# Patient Record
Sex: MALE | Race: WHITE | Employment: OTHER | URBAN - METROPOLITAN AREA
[De-identification: names, ages, dates, MRNs, and addresses within clinical notes are randomized per-mention and may not be internally consistent; named-entity substitution may affect disease eponyms.]

---

## 2020-06-13 ENCOUNTER — TRANSFERRED RECORDS (OUTPATIENT)
Dept: HEALTH INFORMATION MANAGEMENT | Facility: CLINIC | Age: 40
End: 2020-06-13

## 2020-06-18 NOTE — TELEPHONE ENCOUNTER
RECORDS RECEIVED FROM: Internal    DATE RECEIVED: 7/22/20 3PM    NOTES STATUS DETAILS   OFFICE NOTE from referring provider Internal Telephone note 6/17/20    OFFICE NOTE from other specialist [facility] in    DISCHARGE SUMMARY from hospital Internal ED  Note 6/13/20, 6/11/20    MEDICATION LIST Internal         PERTINENT LABS Internal    IMAGING (CT, MRI, EGD) In process CT ABD PELVIS 6/12/20, 6/11/20   XR ABD 6/13/20      REFERRAL INFORMATION    Date referral was placed: 7/22/20   Date all records received:    Date records were scanned into EPIC:    Date records were sent to Provider to review:    Date and recommendation received from provider:  LETTER SENT  SCHEDULE APPOINTMENT   Date patient was contacted to schedule: 6/17/20

## 2020-06-24 ENCOUNTER — VIRTUAL VISIT (OUTPATIENT)
Dept: SURGERY | Facility: CLINIC | Age: 40
End: 2020-06-24
Payer: COMMERCIAL

## 2020-06-24 VITALS — WEIGHT: 190 LBS | BODY MASS INDEX: 25.73 KG/M2 | HEIGHT: 72 IN

## 2020-06-24 DIAGNOSIS — K57.32 DIVERTICULITIS OF COLON: Primary | ICD-10-CM

## 2020-06-24 RX ORDER — EPINEPHRINE 0.3 MG/.3ML
1 INJECTION SUBCUTANEOUS
COMMUNITY
End: 2020-10-05

## 2020-06-24 ASSESSMENT — PAIN SCALES - GENERAL: PAINLEVEL: NO PAIN (0)

## 2020-06-24 ASSESSMENT — MIFFLIN-ST. JEOR: SCORE: 1819.83

## 2020-06-24 NOTE — LETTER
"2020       RE: Dawood Perez  [address]  [place] [address]     Dear Colleague,    Thank you for referring your patient, Dawood Perez, to the [facility] COLON AND RECTAL SURGERY at [facility]. Please see a copy of my visit note below.    Colon and Rectal Surgery Consult Clinic Note    Referring provider:  Referred Self, MD  No address on file       RE: Dawood Perez  : 1980  ESTEFANIA: 2020      Dawood Perez is a very pleasant 40 year old male without a significant past medical history with a recent diagnosis of  Diverticulitis being evaluated via a billable video visit.  Given these findings they were subsequently sent to the Colon and Rectal Surgery Clinic for an opinion on this and a new patient consultation.       The patient has been notified of following:     \"This video visit will be conducted via a call between you and your physician/provider. We have found that certain health care needs can be provided without the need for an in-person physical exam.  This service lets us provide the care you need with a video conversation.  If a prescription is necessary we can send it directly to your pharmacy.  If lab work is needed we can place an order for that and you can then stop by our lab to have the test done at a later time.    Video visits are billed at different rates depending on your insurance coverage.  Please reach out to your insurance provider with any questions.    If during the course of the call the physician/provider feels a video visit is not appropriate, you will not be charged for this service.\"    Patient has given verbal consent for Video visit? Yes  How would you like to obtain your AVS? MyChart  Will anyone else be joining your video visit? No     Video-Visit Details    Type of service:  Video Visit    Video Start Time: 5:15  Video End Time: 5:46    Originating Location (pt. Location): Home    Distant Location (provider location): "  [facility] COLON AND RECTAL SURGERY     Platform used for Video Visit: Juan    HISTORY OF PRESENT ILNESS:    Previously healthy. Presented in June 11, 2020 with abdominal pain and fever.  This had been of 2 days duration and the fevers subsequently started and was associated with some constipation which responded to some milk of magnesia    The patient initially presented to primary care and was taken to the emergency room by nursing.  Work-up in the emergency room included a CT scan which demonstrated diverticulitis that was simple in nature.  He was discharged on oral ciprofloxacin and Flagyl.    The patient returned to the emergency room 2 days later with increasing and very severe pain but otherwise stable.  CT scan demonstrated ongoing diverticulitis as well as pneumoperitoneum.  He states that at this time the pain became quite severe while it had improved over the previous 2 days    The patient was transferred to [facility].  The diverticulitis eventually was found to be a Hinchey grade 3 and because of it the location of the abscess which was approximately 3 cm in size it was managed conservatively using antibiotics because it was within the mesentery and not amenable to interventional radiology drainage.  He was discharged on a two-week course of Augmentin on June 16, 2020.    Since discharge on June 16, 2020 the patient is overall doing well.  He states that he is eating a normal diet has normal bowel movements and feels mostly well.  He does state that he had decreased energy for a little bit but currently feels well.  He denies any difficulty with urination, eating, activity, or bowel movements.  He denies any bright red blood per rectum or change in bowel habits.    PLEASE SEE NOTE BELOW FOR PHYSICAL EXAMINATION, REVIEW OF SYSTEMS, AND OTHER HISTORY.      Assessment/Plan: 40 year old male without a significant past medical history with a recent diagnosis of  diverticulitis.  We  discussed that a colonoscopy would be indicated at approximately 8 weeks duration following his initial episode.  We referred him for this today.  It is unclear at this point whether a surgery done electively would be indicated.  He did respond well to conservative treatment despite his development of a contained free perforation.  I explained to him that while her previous thinking was that resection would eventually be indicated for a 40-year-old, this is currently not altogether clear.  Certainly recurrent episodes and/or a complicated episode would warrant surgery.  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  I did give him a prescription for Augmentin that he could use on an as-needed basis for when he does travel remotely as a backup plan.  We certainly do this for other patients that travel and this is sometimes a reason to get elective surgery.  We did speak about the goals of surgical treatment but more broadly in preventing disease.    Total time was 30 minutes, over 50% was spent counseling the patient.     PLEASE SEE NOTE BELOW FOR PHYSICAL EXAMINATION, REVIEW OF SYSTEMS, AND OTHER HISTORY.    Pao Motta MD, MD  Colon and Rectal Surgery Attending  Department of Surgery  [facility]      -------------------------------------------------------------------------------------------------------------------          Medical history:  No past medical history on file.    Surgical history:  No past surgical history on file.    Problem list:  There are no active problems to display for this patient.      Medications:  Current Outpatient Medications   Medication Sig Dispense Refill     amoxicillin-clavulanate (AUGMENTIN) 875-125 MG tablet Take 1 tablet by mouth 2 times daily for 14 days 28 tablet 1     EPINEPHrine (ANY BX GENERIC EQUIV) 0.3  MG/0.3ML injection 2-pack Inject 1 Syringe into the muscle         Allergies:  Allergies   Allergen Reactions     Bee Venom Hives     Penicillins Rash     Tolerated zosyn         Family history:  No family history on file.    Social history:  Social History     Socioeconomic History     Marital status: Single     Spouse name: Not on file     Number of children: Not on file     Years of education: Not on file     Highest education level: Not on file   Occupational History     Not on file   Social Needs     Financial resource strain: Not on file     Food insecurity     Worry: Not on file     Inability: Not on file     Transportation needs     Medical: Not on file     Non-medical: Not on file   Tobacco Use     Smoking status: Never Smoker     Smokeless tobacco: Never Used   Substance and Sexual Activity     Alcohol use: Not on file     Drug use: Not on file     Sexual activity: Not on file   Lifestyle     Physical activity     Days per week: Not on file     Minutes per session: Not on file     Stress: Not on file   Relationships     Social connections     Talks on phone: Not on file     Gets together: Not on file     Attends Synagogue service: Not on file     Active member of club or organization: Not on file     Attends meetings of clubs or organizations: Not on file     Relationship status: Not on file     Intimate partner violence     Fear of current or ex partner: Not on file     Emotionally abused: Not on file     Physically abused: Not on file     Forced sexual activity: Not on file   Other Topics Concern     Not on file   Social History Narrative     Not on file         Nursing Notes:   Derek Colbert EMT  6/24/2020  4:35 PM  Signed  Chief Complaint   Patient presents with     Consult     Diverticulitis.       Vitals:    06/24/20 1634   Weight: 190 lb   Height: 6'       Body mass index is 25.77 kg/m .      TERRANCE Wells                         Physical Examination:  Ht 1.829 m (6')   Wt 86.2 kg (190  lb)   BMI 25.77 kg/m    General: Pleasant, no acute distress   Visit was done via video and limited.     Again, thank you for allowing me to participate in the care of your patient.      Sincerely,    Pao Motta MD

## 2020-06-24 NOTE — LETTER
Date:July 9, 2020      Patient was self referred, no letter generated. Do not send.        [facility] Health Information

## 2020-06-24 NOTE — NURSING NOTE
Chief Complaint   Patient presents with     Consult     Diverticulitis.       Vitals:    06/24/20 1634   Weight: 190 lb   Height: 6'       Body mass index is 25.77 kg/m .      Derek Colbert, EMT

## 2020-06-28 NOTE — PROGRESS NOTES
"Colon and Rectal Surgery Consult Clinic Note    Referring provider:  Referred Self, MD  No address on file       RE: Dawood Perez  : 1980  ESTEFANIA: 2020      Dawood Perez is a very pleasant 40 year old male without a significant past medical history with a recent diagnosis of  Diverticulitis being evaluated via a billable video visit.  Given these findings they were subsequently sent to the Colon and Rectal Surgery Clinic for an opinion on this and a new patient consultation.       The patient has been notified of following:     \"This video visit will be conducted via a call between you and your physician/provider. We have found that certain health care needs can be provided without the need for an in-person physical exam.  This service lets us provide the care you need with a video conversation.  If a prescription is necessary we can send it directly to your pharmacy.  If lab work is needed we can place an order for that and you can then stop by our lab to have the test done at a later time.    Video visits are billed at different rates depending on your insurance coverage.  Please reach out to your insurance provider with any questions.    If during the course of the call the physician/provider feels a video visit is not appropriate, you will not be charged for this service.\"    Patient has given verbal consent for Video visit? Yes  How would you like to obtain your AVS? MyChart  Will anyone else be joining your video visit? No     Video-Visit Details    Type of service:  Video Visit    Video Start Time: 5:15  Video End Time: 5:46    Originating Location (pt. Location): Home    Distant Location (provider location):  [facility] COLON AND RECTAL SURGERY     Platform used for Video Visit: [employer]    HISTORY OF PRESENT ILNESS:    Previously healthy. Presented in 2020 with abdominal pain and fever.  This had been of 2 days duration and the fevers subsequently started and was associated with some " constipation which responded to some milk of magnesia    The patient initially presented to primary care and was taken to the emergency room by nursing.  Work-up in the emergency room included a CT scan which demonstrated diverticulitis that was simple in nature.  He was discharged on oral ciprofloxacin and Flagyl.    The patient returned to the emergency room 2 days later with increasing and very severe pain but otherwise stable.  CT scan demonstrated ongoing diverticulitis as well as pneumoperitoneum.  He states that at this time the pain became quite severe while it had improved over the previous 2 days    The patient was transferred to [facility].  The diverticulitis eventually was found to be a Hinchey grade 3 and because of it the location of the abscess which was approximately 3 cm in size it was managed conservatively using antibiotics because it was within the mesentery and not amenable to interventional radiology drainage.  He was discharged on a two-week course of Augmentin on June 16, 2020.    Since discharge on June 16, 2020 the patient is overall doing well.  He states that he is eating a normal diet has normal bowel movements and feels mostly well.  He does state that he had decreased energy for a little bit but currently feels well.  He denies any difficulty with urination, eating, activity, or bowel movements.  He denies any bright red blood per rectum or change in bowel habits.    PLEASE SEE NOTE BELOW FOR PHYSICAL EXAMINATION, REVIEW OF SYSTEMS, AND OTHER HISTORY.      Assessment/Plan: 40 year old male without a significant past medical history with a recent diagnosis of  diverticulitis.  We discussed that a colonoscopy would be indicated at approximately 8 weeks duration following his initial episode.  We referred him for this today.  It is unclear at this point whether a surgery done electively would be indicated.  He did respond well to conservative treatment despite  his development of a contained free perforation.  I explained to him that while her previous thinking was that resection would eventually be indicated for a 40-year-old, this is currently not altogether clear.  Certainly recurrent episodes and/or a complicated episode would warrant surgery.  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  I did give him a prescription for Augmentin that he could use on an as-needed basis for when he does travel remotely as a backup plan.  We certainly do this for other patients that travel and this is sometimes a reason to get elective surgery.  We did speak about the goals of surgical treatment but more broadly in preventing disease.    Total time was 30 minutes, over 50% was spent counseling the patient.     PLEASE SEE NOTE BELOW FOR PHYSICAL EXAMINATION, REVIEW OF SYSTEMS, AND OTHER HISTORY.    Pao Motta MD, MD  Colon and Rectal Surgery Attending  Department of Surgery  [facility]      -------------------------------------------------------------------------------------------------------------------          Medical history:  No past medical history on file.    Surgical history:  No past surgical history on file.    Problem list:  There are no active problems to display for this patient.      Medications:  Current Outpatient Medications   Medication Sig Dispense Refill     amoxicillin-clavulanate (AUGMENTIN) 875-125 MG tablet Take 1 tablet by mouth 2 times daily for 14 days 28 tablet 1     EPINEPHrine (ANY BX GENERIC EQUIV) 0.3 MG/0.3ML injection 2-pack Inject 1 Syringe into the muscle         Allergies:  Allergies   Allergen Reactions     Bee Venom Hives     Penicillins Rash     Tolerated zosyn         Family history:  No family history on file.    Social history:  Social History     Socioeconomic History      Marital status: Single     Spouse name: Not on file     Number of children: Not on file     Years of education: Not on file     Highest education level: Not on file   Occupational History     Not on file   Social Needs     Financial resource strain: Not on file     Food insecurity     Worry: Not on file     Inability: Not on file     Transportation needs     Medical: Not on file     Non-medical: Not on file   Tobacco Use     Smoking status: Never Smoker     Smokeless tobacco: Never Used   Substance and Sexual Activity     Alcohol use: Not on file     Drug use: Not on file     Sexual activity: Not on file   Lifestyle     Physical activity     Days per week: Not on file     Minutes per session: Not on file     Stress: Not on file   Relationships     Social connections     Talks on phone: Not on file     Gets together: Not on file     Attends Mu-ism service: Not on file     Active member of club or organization: Not on file     Attends meetings of clubs or organizations: Not on file     Relationship status: Not on file     Intimate partner violence     Fear of current or ex partner: Not on file     Emotionally abused: Not on file     Physically abused: Not on file     Forced sexual activity: Not on file   Other Topics Concern     Not on file   Social History Narrative     Not on file         Nursing Notes:   Derek Colbert EMT  6/24/2020  4:35 PM  Signed  Chief Complaint   Patient presents with     Consult     Diverticulitis.       Vitals:    06/24/20 1634   Weight: 190 lb   Height: 6'       Body mass index is 25.77 kg/m .      TERRANCE Wells                         Physical Examination:  Ht 1.829 m (6')   Wt 86.2 kg (190 lb)   BMI 25.77 kg/m    General: Pleasant, no acute distress   Visit was done via video and limited.

## 2020-06-29 DIAGNOSIS — K57.32 DIVERTICULITIS OF COLON: Primary | ICD-10-CM

## 2020-06-29 RX ORDER — POLYETHYLENE GLYCOL 3350 17 G/17G
238 POWDER, FOR SOLUTION ORAL SEE ADMIN INSTRUCTIONS
Qty: 238 G | Refills: 0 | Status: SHIPPED | OUTPATIENT
Start: 2020-06-29 | End: 2020-10-09

## 2020-07-06 ENCOUNTER — TELEPHONE (OUTPATIENT)
Dept: SURGERY | Facility: CLINIC | Age: 40
End: 2020-07-06

## 2020-07-06 NOTE — TELEPHONE ENCOUNTER
Per Case Request patient's Colonoscopy with Dr. Motta should be scheduled 8 weeks out from last Friday 7/3. I checked with SARIKA Caldera in office since 8 weeks out would not be on a surgery date for Dr. Motta. SARIKA Caldera said that as long as it is at least 8 weeks out, then it is fine. Tentative date would be 11 September, 2020. Hold placed on Dr. Motta's calendar.    Phone call to patient at provided number, left voicemail message to call back regarding scheduling.

## 2020-07-17 PROBLEM — K57.32 DIVERTICULITIS OF COLON: Status: ACTIVE | Noted: 2020-07-17

## 2020-07-17 NOTE — TELEPHONE ENCOUNTER
Patient is scheduled for surgery with Dr. Motta    Spoke with Dawood    Date of Surgery: 9/11/2020    Location: [facility]    Informed patient they will need an adult  Yes    H&P:   - will be scheduled with [facility] approximately 1-2 weeks before his procedure as soon as he has chosen a PCP (recently moved to [place] from [place]); he will send a message on [employer] to update us of his PCP choice and appointment info     COVID-19 Test:   - 9/9/2020 at 2:30 pm    Surgery packet: will send via [employer] and Mail

## 2020-07-20 DIAGNOSIS — Z11.59 ENCOUNTER FOR SCREENING FOR OTHER VIRAL DISEASES: Primary | ICD-10-CM

## 2020-07-22 ENCOUNTER — PRE VISIT (OUTPATIENT)
Dept: SURGERY | Facility: CLINIC | Age: 40
End: 2020-07-22

## 2020-08-12 ENCOUNTER — TELEPHONE (OUTPATIENT)
Dept: SURGERY | Facility: CLINIC | Age: 40
End: 2020-08-12

## 2020-08-12 NOTE — TELEPHONE ENCOUNTER
Patient called to reschedule his [facility] Colonoscopy with Dr Motta that was scheduled on 9/11/2020, but has now been moved to 10/9/2020:    Patient is scheduled for surgery with Dr. Ángela Puente with Dawood    Date of Surgery: 10/9/2020    Location: [facility]    Informed patient they will need an adult  Yes    H&P: Scheduled with PCP    COVID-19 test at the [facility] on 10/6/2020 at 2:30 pm    Surgery packet: will send via [employer] and Mail     Additional comments:   - rescheduled due to a wedding

## 2020-10-05 ENCOUNTER — OFFICE VISIT (OUTPATIENT)
Dept: FAMILY MEDICINE | Facility: CLINIC | Age: 40
End: 2020-10-05
Payer: COMMERCIAL

## 2020-10-05 VITALS
WEIGHT: 196 LBS | HEIGHT: 72 IN | DIASTOLIC BLOOD PRESSURE: 89 MMHG | BODY MASS INDEX: 26.55 KG/M2 | TEMPERATURE: 97.1 F | SYSTOLIC BLOOD PRESSURE: 138 MMHG | OXYGEN SATURATION: 98 % | HEART RATE: 98 BPM

## 2020-10-05 DIAGNOSIS — Z76.0 ENCOUNTER FOR MEDICATION REFILL: ICD-10-CM

## 2020-10-05 DIAGNOSIS — K57.32 DIVERTICULITIS OF COLON: ICD-10-CM

## 2020-10-05 DIAGNOSIS — Z01.818 PRE-OPERATIVE GENERAL PHYSICAL EXAMINATION: Primary | ICD-10-CM

## 2020-10-05 DIAGNOSIS — Z91.030 H/O BEE STING ALLERGY: ICD-10-CM

## 2020-10-05 LAB
HGB BLD-MCNC: 16.9 G/DL (ref 13.3–17.7)
PLATELET # BLD AUTO: 198 10E9/L (ref 150–450)

## 2020-10-05 PROCEDURE — 99204 OFFICE O/P NEW MOD 45 MIN: CPT | Performed by: INTERNAL MEDICINE

## 2020-10-05 PROCEDURE — 85018 HEMOGLOBIN: CPT | Performed by: INTERNAL MEDICINE

## 2020-10-05 PROCEDURE — 36415 COLL VENOUS BLD VENIPUNCTURE: CPT | Performed by: INTERNAL MEDICINE

## 2020-10-05 PROCEDURE — 85049 AUTOMATED PLATELET COUNT: CPT | Performed by: INTERNAL MEDICINE

## 2020-10-05 RX ORDER — EPINEPHRINE 0.3 MG/.3ML
0.3 INJECTION SUBCUTANEOUS PRN
Qty: 1 EACH | Refills: 3 | Status: SHIPPED | OUTPATIENT
Start: 2020-10-05

## 2020-10-05 ASSESSMENT — MIFFLIN-ST. JEOR: SCORE: 1847.05

## 2020-10-05 NOTE — PROGRESS NOTES
Date: 10/9/2020 Time:  7:15 AM Status: Scheduled   Location: [facility] Room: [facility] Service: [place]-Rectal   Patient class: Outpatient             Panel 1    Provider Role   Pao Motta MD Primary    Procedure Laterality Anesthesia   COLONOSCOPY N/A Conscious Sedation

## 2020-10-05 NOTE — PROGRESS NOTES
[address] [address]  Phone: [phone]  Primary Provider: No primary care provider on file.  Pre-op Performing Provider: IDALIA HANSON    PREOPERATIVE EVALUATION:  Today's date: 10/5/2020    Dawood Perez is a 40 year old male who presents for a preoperative evaluation.    Surgical Information:  Surgery Details 10/5/2020   Surgery/Procedure: Colonoscopy   [place] [facility] [place]geon: Pao Motta MD   Surgery Date: 10/09/2020   Time of Surgery: 7:15 AM   Where patient plans to recover: At home with family     Fax number for surgical facility: Note does not need to be faxed, will be available electronically in Epic.  Type of Anesthesia Anticipated: to be determined    Subjective     HPI related to upcoming procedure: patient Dawood Perez is a very pleasant 40 year old male with diverticulitis of the colon who presents to internal medicine clinic for a pre op cardiac evaluation for upcoming GI procedure with colonoscopy for treatment of diverticulitis. Patient denies any known allergies to anesthesia agents. Patient denies any chest pain, headaches, fever or chills. Patient denies any known chest pain, headaches, fever or chills. Patient does not take any daily aspirin or any other blood thinner medications.         Preop Questions 10/5/2020   1. Have you ever had a heart attack or stroke? No   2. Have you ever had surgery on your heart or blood vessels, such as a stent placement, a coronary artery bypass, or surgery on an artery in your head, neck, heart, or legs? No   3. Do you have chest pain with activity? No   4. Do you have a history of  heart failure? No   5. Do you currently have a cold, bronchitis or symptoms of other infection? No   6. Do you have a cough, shortness of breath, or wheezing? No   7. Do you or anyone in your family have previous history of blood clots? No   8. Do you or does anyone in your family have a serious  "bleeding problem such as prolonged bleeding following surgeries or cuts? No   9. Have you ever had problems with anemia or been told to take iron pills? No   10. Have you had any abnormal blood loss such as black, tarry or bloody stools? No   11. Have you ever had a blood transfusion? No   Are you willing to have a blood transfusion if it is medically needed before, during, or after your surgery? Yes   13. Have you or any of your relatives ever had problems with anesthesia? No   14. Do you have sleep apnea, excessive snoring or daytime drowsiness? No   15. Do you have any artifical heart valves or other implanted medical devices like a pacemaker, defibrillator, or continuous glucose monitor? No   16. Do you have artificial joints? No   17. Are you allergic to latex? No     Patient does not have a Health Care Directive or Living Will: Discussed advance care planning with patient; however, patient declined at this time.      Review of Systems  Constitutional, neuro, ENT, endocrine, pulmonary, cardiac, gastrointestinal, genitourinary, musculoskeletal, integument and psychiatric systems are negative, except as otherwise noted.    Patient Active Problem List    Diagnosis Date Noted     Diverticulitis of colon 07/17/2020     Priority: Medium     Added automatically from request for surgery [id number]        No past medical history on file.  No past surgical history on file.  Current Outpatient Medications   Medication Sig Dispense Refill     EPINEPHrine (ANY BX GENERIC EQUIV) 0.3 MG/0.3ML injection 2-pack Inject 1 Syringe into the muscle       polyethylene glycol (MIRALAX) 17 g packet Take 238 g by mouth See Admin Instructions Start at 4 pm night prior to colonoscopy. Refer to \"Getting Ready for Colonoscopy\" instructions. 238 g 0       Allergies   Allergen Reactions     Bee Venom Hives     Penicillins Rash     Tolerated zosyn          Social History     Tobacco Use     Smoking status: Never Smoker     Smokeless tobacco: " Never Used   Substance Use Topics     Alcohol use: Not on file     Family History   Problem Relation Age of Onset     Hypertension Mother      Heart Disease Father      History   Drug Use Not on file            Objective   /89 (BP Location: Left arm, Patient Position: Sitting, Cuff Size: Adult Large)   Pulse 98   Temp 97.1  F (36.2  C) (Temporal)   Ht 1.829 m (6')   Wt 88.9 kg (196 lb)   SpO2 98%   BMI 26.58 kg/m    Physical Exam    GENERAL APPEARANCE: alert and no distress     EYES: EOMI, PERRL     HENT: ear canals and TM's normal and nose and mouth without ulcers or lesions     NECK: no adenopathy, no asymmetry, masses, or scars and thyroid normal to palpation     RESP: lungs clear to auscultation - no rales, rhonchi or wheezes     CV: regular rates and rhythm, normal S1 S2, no S3 or S4 and no murmur, click or rub     ABDOMEN:  soft, nontender, no HSM or masses and bowel sounds normal     MS: extremities normal- no gross deformities noted, no evidence of inflammation in joints, FROM in all extremities.     SKIN: no suspicious lesions or rashes     NEURO: Normal strength and tone, sensory exam grossly normal, mentation intact and speech normal     PSYCH: mentation appears normal. and affect normal/bright     LYMPHATICS: No cervical adenopathy        PRE-OP Diagnostics:  Results for orders placed or performed in visit on 10/05/20   Hemoglobin     Status: None   Result Value Ref Range    Hemoglobin 16.9 13.3 - 17.7 g/dL   Platelet count     Status: None   Result Value Ref Range    Platelet Count 198 150 - 450 10e9/L         No EKG required, no history of coronary heart disease, significant arrhythmia, peripheral arterial disease or other structural heart disease.         Assessment & Plan   The proposed surgical procedure is considered LOW risk.    REVISED CARDIAC RISK INDEX  The patient has the following serious cardiovascular risks for perioperative complications:  No serious cardiac risks = 0  points    INTERPRETATION: 0 points: Class I (very low risk - 0.4% complication rate)     (K57.32) Diverticulitis of colon  (Z01.818) Pre-operative general physical examination  (primary encounter diagnosis)  Comment: pre op cardiac evaluation for upcoming colonoscopy GI procedure for treatment of diverticulitis of the colon.  Plan: Hemoglobin, Platelet count    (Z76.0) Encounter for medication refill  (Z91.030) H/O bee sting allergy  Comment: stable, patient is due for a refill of Epipen medication. No recent acute bee stings.   Plan: EPINEPHrine (ANY BX GENERIC EQUIV) 0.3 MG/0.3ML        injection 2-pack      The patient has the following additional risks and recommendations for perioperative complications:     - No identified additional risk factors other than previously addressed     MEDICATION INSTRUCTIONS:  Patient is to take all scheduled medications on the day of surgery    RECOMMENDATION:  APPROVAL GIVEN to proceed with proposed procedure, without further diagnostic evaluation.      Signed Electronically by: Chayo Manuel MD    Copy of this evaluation report is provided to requesting physician.    [facility] Preop Guidelines    Revised Cardiac Risk Index

## 2020-10-06 DIAGNOSIS — Z11.59 ENCOUNTER FOR SCREENING FOR OTHER VIRAL DISEASES: ICD-10-CM

## 2020-10-06 PROCEDURE — 99000 SPECIMEN HANDLING OFFICE-LAB: CPT | Performed by: PATHOLOGY

## 2020-10-06 PROCEDURE — U0003 INFECTIOUS AGENT DETECTION BY NUCLEIC ACID (DNA OR RNA); SEVERE ACUTE RESPIRATORY SYNDROME CORONAVIRUS 2 (SARS-COV-2) (CORONAVIRUS DISEASE [COVID-19]), AMPLIFIED PROBE TECHNIQUE, MAKING USE OF HIGH THROUGHPUT TECHNOLOGIES AS DESCRIBED BY CMS-2020-01-R: HCPCS | Mod: 90 | Performed by: PATHOLOGY

## 2020-10-07 LAB
SARS-COV-2 RNA SPEC QL NAA+PROBE: NOT DETECTED
SPECIMEN SOURCE: NORMAL

## 2020-10-08 ENCOUNTER — ANESTHESIA EVENT (OUTPATIENT)
Dept: SURGERY | Facility: AMBULATORY SURGERY CENTER | Age: 40
End: 2020-10-08

## 2020-10-08 NOTE — ANESTHESIA PREPROCEDURE EVALUATION
"Anesthesia Pre-Procedure Evaluation    Patient: Dawood Perez   MRN:     [id number] Gender:   male   Age:    40 year old :      1980        Preoperative Diagnosis: Diverticulitis of colon [K57.32]   Procedure(s):  COLONOSCOPY     LABS:  CBC:   Lab Results   Component Value Date    HGB 16.9 10/05/2020     10/05/2020     BMP: No results found for: NA, POTASSIUM, CHLORIDE, CO2, BUN, CR, GLC  COAGS: No results found for: PTT, INR, FIBR  POC: No results found for: BGM, HCG, HCGS  OTHER: No results found for: PH, LACT, A1C, CLAYTON, PHOS, MAG, ALBUMIN, PROTTOTAL, ALT, AST, GGT, ALKPHOS, BILITOTAL, BILIDIRECT, LIPASE, AMYLASE, ILDEFONSO, TSH, T4, T3, CRP, SED     Preop Vitals    BP Readings from Last 3 Encounters:   10/05/20 138/89    Pulse Readings from Last 3 Encounters:   10/05/20 98      Resp Readings from Last 3 Encounters:   No data found for Resp    SpO2 Readings from Last 3 Encounters:   10/05/20 98%      Temp Readings from Last 1 Encounters:   10/05/20 36.2  C (97.1  F) (Temporal)    Ht Readings from Last 1 Encounters:   10/05/20 1.829 m (6')      Wt Readings from Last 1 Encounters:   10/05/20 88.9 kg (196 lb)    Estimated body mass index is 26.58 kg/m  as calculated from the following:    Height as of 10/5/20: 1.829 m (6').    Weight as of 10/5/20: 88.9 kg (196 lb).     LDA:        Past Medical History:   Diagnosis Date     Diverticulitis of colon      Hypertension     \"Boarderline hypertensive\"      No past surgical history on file.   Allergies   Allergen Reactions     Bee Venom Hives     Penicillins Rash     Tolerated zosyn          Anesthesia Evaluation     . Pt has had prior anesthetic. Type: General    No history of anesthetic complications          ROS/MED HX    ENT/Pulmonary:  - neg pulmonary ROS     Neurologic:       Cardiovascular:  - neg cardiovascular ROS       METS/Exercise Tolerance:     Hematologic:         Musculoskeletal:         GI/Hepatic: Comment: diverticulitis      "   Renal/Genitourinary:         Endo:         Psychiatric:         Infectious Disease:         Malignancy:         Other:                         PHYSICAL EXAM:   Mental Status/Neuro: A/A/O   Airway: Facies: Feasible  Mallampati: II  Mouth/Opening: Full   Respiratory: Auscultation: CTAB      CV: Rhythm: Regular   Comments:                      Assessment:   ASA SCORE: 1    H&P: History and physical reviewed and following examination; no interval change.   Smoking Status:  Non-Smoker/Unknown   NPO Status: NPO Appropriate     Plan:   Anes. Type:  MAC   Pre-Medication: None   Induction:  N/a   Airway: Native Airway   Access/Monitoring: PIV   Maintenance: N/a     Postop Plan:   Postop Pain: None  Postop Sedation/Airway: Not planned  Disposition: Outpatient     PONV Management:  NO PONV Prophylaxis Required     CONSENT: Direct conversation   Plan and risks discussed with: Patient                      Yobani Garcia MD

## 2020-10-09 ENCOUNTER — HOSPITAL ENCOUNTER (OUTPATIENT)
Facility: AMBULATORY SURGERY CENTER | Age: 40
Discharge: HOME | End: 2020-10-09
Attending: COLON & RECTAL SURGERY | Admitting: COLON & RECTAL SURGERY
Payer: COMMERCIAL

## 2020-10-09 ENCOUNTER — ANESTHESIA (OUTPATIENT)
Dept: SURGERY | Facility: AMBULATORY SURGERY CENTER | Age: 40
End: 2020-10-09

## 2020-10-09 VITALS
HEART RATE: 73 BPM | BODY MASS INDEX: 26.41 KG/M2 | WEIGHT: 195 LBS | RESPIRATION RATE: 16 BRPM | HEIGHT: 72 IN | OXYGEN SATURATION: 98 % | TEMPERATURE: 97.5 F | DIASTOLIC BLOOD PRESSURE: 78 MMHG | SYSTOLIC BLOOD PRESSURE: 135 MMHG

## 2020-10-09 DIAGNOSIS — K57.32 DIVERTICULITIS OF COLON: ICD-10-CM

## 2020-10-09 PROCEDURE — 45378 DIAGNOSTIC COLONOSCOPY: CPT

## 2020-10-09 PROCEDURE — 45378 DIAGNOSTIC COLONOSCOPY: CPT | Performed by: COLON & RECTAL SURGERY

## 2020-10-09 RX ORDER — PROPOFOL 10 MG/ML
INJECTION, EMULSION INTRAVENOUS PRN
Status: DISCONTINUED | OUTPATIENT
Start: 2020-10-09 | End: 2020-10-09

## 2020-10-09 RX ORDER — SODIUM CHLORIDE, SODIUM LACTATE, POTASSIUM CHLORIDE, CALCIUM CHLORIDE 600; 310; 30; 20 MG/100ML; MG/100ML; MG/100ML; MG/100ML
INJECTION, SOLUTION INTRAVENOUS CONTINUOUS
Status: DISCONTINUED | OUTPATIENT
Start: 2020-10-09 | End: 2020-10-10 | Stop reason: HOSPADM

## 2020-10-09 RX ORDER — ONDANSETRON 2 MG/ML
INJECTION INTRAMUSCULAR; INTRAVENOUS PRN
Status: DISCONTINUED | OUTPATIENT
Start: 2020-10-09 | End: 2020-10-09

## 2020-10-09 RX ORDER — LIDOCAINE HYDROCHLORIDE 20 MG/ML
INJECTION, SOLUTION INFILTRATION; PERINEURAL PRN
Status: DISCONTINUED | OUTPATIENT
Start: 2020-10-09 | End: 2020-10-09

## 2020-10-09 RX ORDER — PROPOFOL 10 MG/ML
INJECTION, EMULSION INTRAVENOUS CONTINUOUS PRN
Status: DISCONTINUED | OUTPATIENT
Start: 2020-10-09 | End: 2020-10-09

## 2020-10-09 RX ORDER — LIDOCAINE 40 MG/G
CREAM TOPICAL
Status: DISCONTINUED | OUTPATIENT
Start: 2020-10-09 | End: 2020-10-10 | Stop reason: HOSPADM

## 2020-10-09 RX ADMIN — PROPOFOL 125 MCG/KG/MIN: 10 INJECTION, EMULSION INTRAVENOUS at 07:31

## 2020-10-09 RX ADMIN — PROPOFOL 40 MG: 10 INJECTION, EMULSION INTRAVENOUS at 07:31

## 2020-10-09 RX ADMIN — PROPOFOL 50 MG: 10 INJECTION, EMULSION INTRAVENOUS at 07:37

## 2020-10-09 RX ADMIN — ONDANSETRON 4 MG: 2 INJECTION INTRAMUSCULAR; INTRAVENOUS at 07:31

## 2020-10-09 RX ADMIN — SODIUM CHLORIDE, SODIUM LACTATE, POTASSIUM CHLORIDE, CALCIUM CHLORIDE: 600; 310; 30; 20 INJECTION, SOLUTION INTRAVENOUS at 07:28

## 2020-10-09 RX ADMIN — LIDOCAINE HYDROCHLORIDE 40 MG: 20 INJECTION, SOLUTION INFILTRATION; PERINEURAL at 07:32

## 2020-10-09 ASSESSMENT — MIFFLIN-ST. JEOR: SCORE: 1842.64

## 2020-10-09 NOTE — ANESTHESIA POSTPROCEDURE EVALUATION
Anesthesia POST Procedure Evaluation    Patient: Dawood Perez   MRN:     [id number] Gender:   male   Age:    40 year old :      1980        Preoperative Diagnosis: Diverticulitis of colon [K57.32]   Procedure(s):  COLONOSCOPY   Postop Comments: No value filed.     Anesthesia Type: MAC       Disposition: Outpatient   Postop Pain Control: Uneventful            Sign Out: Well controlled pain   PONV: No   Neuro/Psych: Uneventful            Sign Out: Acceptable/Baseline neuro status   Airway/Respiratory: Uneventful            Sign Out: Acceptable/Baseline resp. status   CV/Hemodynamics: Uneventful            Sign Out: Acceptable CV status   Other NRE: NONE   DID A NON-ROUTINE EVENT OCCUR? No         Last Anesthesia Record Vitals:  CRNA VITALS  10/9/2020 0725 - 10/9/2020 0825      10/9/2020             Resp Rate (set):  10          Last PACU Vitals:  Vitals Value Taken Time   /80 10/09/20 0758   Temp 36.3  C (97.4  F) 10/09/20 0758   Pulse     Resp 14 10/09/20 0758   SpO2 96 % 10/09/20 0758   Temp src     NIBP     Pulse     SpO2     Resp     Temp     Ht Rate     Temp 2           Electronically Signed By: Yobani Garcia MD, 2020, 12:25 PM

## 2020-10-09 NOTE — ANESTHESIA CARE TRANSFER NOTE
Patient: Dawood Perez    Procedure(s):  COLONOSCOPY    Diagnosis: Diverticulitis of colon [K57.32]  Diagnosis Additional Information: No value filed.    Anesthesia Type:   MAC     Note:  Airway :Nasal Cannula  Patient transferred to:Phase II  Handoff Report: Identifed the Patient, Identified the Reponsible Provider, Reviewed the pertinent medical history, Discussed the surgical course, Reviewed Intra-OP anesthesia mangement and issues during anesthesia, Set expectations for post-procedure period and Allowed opportunity for questions and acknowledgement of understanding      Vitals: (Last set prior to Anesthesia Care Transfer)    CRNA VITALS  10/9/2020 0725 - 10/9/2020 0758      10/9/2020             Resp Rate (set):  10                Electronically Signed By: ELOY Morris CRNA  October 9, 2020  7:58 AM

## 2020-10-09 NOTE — DISCHARGE INSTRUCTIONS
[facility]  Home Care Following Anesthesia  For 24 hours after surgery:  1. Get plenty of rest.  A responsible adult must stay with you for at least 24 hours after you leave the surgery center.  2. Do not drive or use heavy equipment.  If you have weakness or tingling, don't drive or use heavy equipment until this feeling goes away.   3. Do not drink alcohol.   4. Avoid strenuous or risky activities.  Ask for help when climbing stairs.  5. You may feel lightheaded.  IF so, sit for a few minutes before standing.  Have someone help you get up.   6. If you have nausea (feel sick to your stomach): Drink only clear liquids such as apple juice, ginger ale, broth or 7-Up.  Rest may also help.  Be sure to drink enough fluids.  Move to a regular diet as you feel able.   7. You may have a slight fever.  Call the doctor if your fever is over 100 F (37.7 C) (taken under the tongue) or lasts longer than 24 hours.  8. You may have a dry mouth, a sore throat, muscle aches or trouble sleeping. These should go away after 24 hours.  9. Do not make important or legal decisions.               Tips for taking pain medications  To get the best pain relief possible, remember these points:    Take pain medications as directed, before pain becomes severe.    Pain medication can upset your stomach: taking it with food may help.    Constipation is a common side effect of pain medication. Drink plenty of  fluids.    Eat foods high in fiber. Take a stool softener if recommended by your doctor or pharmacist.    Do not drink alcohol, drive or operate machinery while taking pain medications.    Ask about other ways to control pain, such as with heat, ice or relaxation.    Tylenol/Acetaminophen Consumption  To help encourage the safe use of acetaminophen, the makers of TYLENOL  have lowered the maximum daily dose for single-ingredient Extra Strength TYLENOL  (acetaminophen) products sold in the [place] from 8  pills per day (4,000 mg) to 6 pills per day (3,000 mg). The dosing interval has also changed from 2 pills every 4-6 hours to 2 pills every 6 hours.    If you feel your pain relief is insufficient, you may take Tylenol/Acetaminophen in addition to your narcotic pain medication.     Be careful not to exceed 3,000 mg of Tylenol/Acetaminophen in a 24 hour period from all sources.    If you are taking extra strength Tylenol/acetaminophen (500 mg), the maximum dose is 6 tablets in 24 hours.    If you are taking regular strength acetaminophen (325 mg), the maximum dose is 9 tablets in 24 hours.    Call a doctor for any of the followin. Signs of infection (fever, growing tenderness at the surgery site, a large amount of drainage or bleeding, severe pain, foul-smelling drainage, redness, swelling).  2. It has been over 8 to 10 hours since surgery and you are still not able to urinate (pass water).  3. Headache for over 24 hours.  4. Signs of Covid-19 infection (temperature over 100 degrees, shortness of breath, cough, loss of taste/smell, generalized body aches, persistent headache, chills, sore throat, nausea/vomiting/diarrhea)  Your doctor is:       Dr. Pao Motta, Colon Rectal: [phone]               Or dial [phone] and ask for the resident on call for:  Colon Rectal  For emergency care, call the:  [facility]:  [phone] (TTY for hearing impaired: [phone])

## 2020-10-09 NOTE — OP NOTE
SURGEON: Romie Mejia MD     ASSISTANT: None    PREOPERATIVE DIAGNOSIS: History of diverticulitis.     POSTOPERATIVE DIAGNOSIS: Diverticulosis with mild signs of previous diverticulitis.    PROCEDURE: Colonoscopy.    INDICATIONS: Dawood Perez is a 40 year old male who presented with diverticulitis 6/2020 and has since recovered. This included evidence of a Hinchey grade 3 abscess of 3 cm in size unamenable to drainage. It was treated with antibiotics and he recovered well. The risks and benefits of surgery were thoroughly discussed with the patient and he agreed to proceed.     DESCRIPTION OF PROCEDURE: The patient was brought to the operating room, placed in left lateral decubitus position on the cart. Moderate sedation was induced with intravenous medicines and continuous pulse oxymetry monitoring with heart rate and frequent blood pressures. The perianal and digital rectal examinations were normal. The colonoscope was advanced to the cecum where the ileocecal valve and appendiceal orifice were visualized. The preparation was good and the colonoscope was withdrawn over 9 minutes. A moderate amount of small and medium-mouthed diverticula were found in the sigmoid colon. There was a small amount of inflammation consistent with the diverticulitis with otherwise normal mucosa. The retroflexed view of the distal rectum and anal verge was normal and showed no anal or rectal abnormalities.  The patient tolerated the procedure well and was emerged from anesthesia and taken to postoperative anesthesia care unit in good condition.     SPECIMEN: None.      ROMIE MEJIA MD   Colon and Rectal Surgery Staff  [facility]

## 2020-11-07 ENCOUNTER — HEALTH MAINTENANCE LETTER (OUTPATIENT)
Age: 40
End: 2020-11-07

## 2020-11-10 NOTE — PROGRESS NOTES
"Colon and Rectal Surgery Consult Video Note    Date: 11/10/2020     Referring provider:  Referred Self, MD  No address on file     RE: Dawood Perez  : 1980  ESTEFANIA: 2020    Dawood Perez is a 40 year old male who is being evaluated via a billable video visit.      The patient has been notified of following:     \"This video visit will be conducted via a call between you and your physician/provider. We have found that certain health care needs can be provided without the need for an in-person physical exam.  This service lets us provide the care you need with a video conversation.  If a prescription is necessary we can send it directly to your pharmacy.  If lab work is needed we can place an order for that and you can then stop by our lab to have the test done at a later time.    Video visits are billed at different rates depending on your insurance coverage.  Please reach out to your insurance provider with any questions.    If during the course of the call the physician/provider feels a video visit is not appropriate, you will not be charged for this service.\"    Patient has given verbal consent for Video visit? Yes    Video Start Time: 6:45     Dawood Perez is a very pleasant 40 year old male with visit today for follow up of diverticulitis.    HISTORY OF PRESENT ILNESS :  I had a virtual visit with Dawood on 20. Brief synopsis of history:    Previously healthy. Presented in 2020 with abdominal pain and fever.  This had been of 2 days duration and the fevers subsequently started and was associated with some constipation which responded to some milk of magnesia    The patient initially presented to primary care and was taken to the emergency room by nursing.  Work-up in the emergency room included a CT scan which demonstrated diverticulitis that was simple in nature.  He was discharged on oral ciprofloxacin and Flagyl.    The patient returned to the emergency room 2 days later with " increasing and very severe pain but otherwise stable.  CT scan demonstrated ongoing diverticulitis as well as pneumoperitoneum.  He states that at this time the pain became quite severe while it had improved over the previous 2 days    The patient was transferred to [facility].  The diverticulitis eventually was found to be a Hinchey grade 3 and because of it the location of the abscess which was approximately 3 cm in size it was managed conservatively using antibiotics because it was within the mesentery and not amenable to interventional radiology drainage.  He was discharged on a two-week course of Augmentin on June 16, 2020.    Since discharge on June 16, 2020 the patient is overall doing well.  He states that he is eating a normal diet has normal bowel movements and feels mostly well.  He does state that he had decreased energy for a little bit but currently feels well.  He denies any difficulty with urination, eating, activity, or bowel movements.  He denies any bright red blood per rectum or change in bowel habits.    Colonoscopy 10/9/20 with diverticulosis and a small amount of inflammation consistent with the diverticulitis.    Interval History: Now mostly in [place] working as a . He is unable to work in [place] as he has historically because of COVID travel restrictions. No repeat symptoms from diverticulitis. He is able to eat without difficulty. Normal bowel movements and other abdominal symptoms are negligible.       Assessment/Plan: 40 year old male with single episode of complicated diverticulitis. Clean colonoscopy  Reassured him on colonoscopy findings.  He has not had additional flares. He has antibiotics on hand in case he is more remote for work sometimes.  He can liberalize his diet. We also discussed the possible use of probiotics which has some limited evidence.  His age makes additional episodes more likely in the future but outside of the age, the need for  "surgical resection is debatable. We spoke at length about this and at this point he might consider surgery in the spring. But has not firmly concluded this.    He will follow as needed. He has refills on his prescription.     Total time was 15 minutes, over 50% was spent counseling the patient.      Video-Visit Details    Type of service:  Video Visit    Video End Time (time video stopped): 7PM    Originating Location (pt. Location): [place]    Distant Location (provider location):  [facility] [place]     Mode of Communication:  Video Conference via [employer] Platform     Medical history:  Past Medical History:   Diagnosis Date     Diverticulitis of colon      Hypertension     \"Boarderline hypertensive\"       Surgical history:  Past Surgical History:   Procedure Laterality Date     COLONOSCOPY N/A 10/9/2020    Procedure: COLONOSCOPY;  Surgeon: Pao Motta MD;  Location: [facility] [place]       Problem list:    Patient Active Problem List    Diagnosis Date Noted     Diverticulitis of colon 07/17/2020     Priority: Medium     Added automatically from request for surgery [id number]         Medications:  Current Outpatient Medications   Medication Sig Dispense Refill     EPINEPHrine (ANY BX GENERIC EQUIV) 0.3 MG/0.3ML injection 2-pack Inject 0.3 mLs (0.3 mg) into the muscle as needed for anaphylaxis 1 each 3       Allergies:  Allergies   Allergen Reactions     Bee Venom Hives     Penicillins Rash     Tolerated zosyn         Family history:  Family History   Problem Relation Age of Onset     Hypertension Mother      Heart Disease Father        Social history:  Social History     Tobacco Use     Smoking status: Never Smoker     Smokeless tobacco: Never Used   Substance Use Topics     Alcohol use: Yes     Comment: Two to three drinks per day    Marital status: single.      Nursing Notes:   Derek Colbert, EMT  11/11/2020  6:01 PM  Signed  Chief Complaint   Patient presents with     Consult "     Diverticulitis.       Vitals:    11/11/20 1800   Weight: 190 lb   Height: 6'       Body mass index is 25.77 kg/m .      Derek Baird, EMT                               Pao Motta MD  Colon and Rectal Surgery Staff  [facility]    This note was created using speech recognition software and may contain unintended word substitutions.

## 2020-11-11 ENCOUNTER — VIRTUAL VISIT (OUTPATIENT)
Dept: SURGERY | Facility: CLINIC | Age: 40
End: 2020-11-11
Payer: COMMERCIAL

## 2020-11-11 VITALS — HEIGHT: 72 IN | WEIGHT: 190 LBS | BODY MASS INDEX: 25.73 KG/M2

## 2020-11-11 DIAGNOSIS — K57.32 DIVERTICULITIS OF COLON: Primary | ICD-10-CM

## 2020-11-11 PROCEDURE — 99213 OFFICE O/P EST LOW 20 MIN: CPT | Mod: 95 | Performed by: COLON & RECTAL SURGERY

## 2020-11-11 ASSESSMENT — MIFFLIN-ST. JEOR: SCORE: 1819.83

## 2020-11-11 NOTE — LETTER
"    RE: Dawood Perez  [address]  [place] [address]     Dear Colleague,    Thank you for referring your patient, Dawood Perez, to the [facility] [place] at [facility]. Please see a copy of my visit note below.    Colon and Rectal Surgery Consult Video Note    Date: 11/10/2020     Referring provider:  Referred Self, MD  No address on file     RE: Dawood Perez  : 1980  ESTEFANIA: 2020    Dawood Perez is a 40 year old male who is being evaluated via a billable video visit.      The patient has been notified of following:     \"This video visit will be conducted via a call between you and your physician/provider. We have found that certain health care needs can be provided without the need for an in-person physical exam.  This service lets us provide the care you need with a video conversation.  If a prescription is necessary we can send it directly to your pharmacy.  If lab work is needed we can place an order for that and you can then stop by our lab to have the test done at a later time.    Video visits are billed at different rates depending on your insurance coverage.  Please reach out to your insurance provider with any questions.    If during the course of the call the physician/provider feels a video visit is not appropriate, you will not be charged for this service.\"    Patient has given verbal consent for Video visit? Yes    Video Start Time: 6:45     Dawood Perez is a very pleasant 40 year old male with visit today for follow up of diverticulitis.    HISTORY OF PRESENT ILNESS :  I had a virtual visit with Dawood on 20. Brief synopsis of history:    Previously healthy. Presented in 2020 with abdominal pain and fever.  This had been of 2 days duration and the fevers subsequently started and was associated with some constipation which responded to some milk of magnesia    The patient initially " presented to primary care and was taken to the emergency room by nursing.  Work-up in the emergency room included a CT scan which demonstrated diverticulitis that was simple in nature.  He was discharged on oral ciprofloxacin and Flagyl.    The patient returned to the emergency room 2 days later with increasing and very severe pain but otherwise stable.  CT scan demonstrated ongoing diverticulitis as well as pneumoperitoneum.  He states that at this time the pain became quite severe while it had improved over the previous 2 days    The patient was transferred to [facility].  The diverticulitis eventually was found to be a Hinchey grade 3 and because of it the location of the abscess which was approximately 3 cm in size it was managed conservatively using antibiotics because it was within the mesentery and not amenable to interventional radiology drainage.  He was discharged on a two-week course of Augmentin on June 16, 2020.    Since discharge on June 16, 2020 the patient is overall doing well.  He states that he is eating a normal diet has normal bowel movements and feels mostly well.  He does state that he had decreased energy for a little bit but currently feels well.  He denies any difficulty with urination, eating, activity, or bowel movements.  He denies any bright red blood per rectum or change in bowel habits.    Colonoscopy 10/9/20 with diverticulosis and a small amount of inflammation consistent with the diverticulitis.    Interval History: Now mostly in [place] working as a . He is unable to work in [place] as he has historically because of COVID travel restrictions. No repeat symptoms from diverticulitis. He is able to eat without difficulty. Normal bowel movements and other abdominal symptoms are negligible.       Assessment/Plan: 40 year old male with single episode of complicated diverticulitis. Clean colonoscopy  Reassured him on colonoscopy findings.  He has not  "had additional flares. He has antibiotics on hand in case he is more remote for work sometimes.  He can liberalize his diet. We also discussed the possible use of probiotics which has some limited evidence.  His age makes additional episodes more likely in the future but outside of the age, the need for surgical resection is debatable. We spoke at length about this and at this point he might consider surgery in the spring. But has not firmly concluded this.    He will follow as needed. He has refills on his prescription.     Total time was 15 minutes, over 50% was spent counseling the patient.      Video-Visit Details    Type of service:  Video Visit    Video End Time (time video stopped): 7PM    Originating Location (pt. Location): [place]    Distant Location (provider location):  [facility] [place]     Mode of Communication:  Video Conference via Me[employer]     Medical history:  Past Medical History:   Diagnosis Date     Diverticulitis of colon      Hypertension     \"Boarderline hypertensive\"       Surgical history:  Past Surgical History:   Procedure Laterality Date     COLONOSCOPY N/A 10/9/2020    Procedure: COLONOSCOPY;  Surgeon: Pao Motta MD;  Location: [facility] OR       Problem list:    Patient Active Problem List    Diagnosis Date Noted     Diverticulitis of colon 07/17/2020     Priority: Medium     Added automatically from request for surgery [id number]         Medications:  Current Outpatient Medications   Medication Sig Dispense Refill     EPINEPHrine (ANY BX GENERIC EQUIV) 0.3 MG/0.3ML injection 2-pack Inject 0.3 mLs (0.3 mg) into the muscle as needed for anaphylaxis 1 each 3       Allergies:  Allergies   Allergen Reactions     Bee Venom Hives     Penicillins Rash     Tolerated zosyn         Family history:  Family History   Problem Relation Age of Onset     Hypertension Mother      Heart Disease Father        Social history:  Social History     Tobacco Use "     Smoking status: Never Smoker     Smokeless tobacco: Never Used   Substance Use Topics     Alcohol use: Yes     Comment: Two to three drinks per day    Marital status: single.      Nursing Notes:   Derek Colbert EMT  11/11/2020  6:01 PM  Signed  Chief Complaint   Patient presents with     Consult     Diverticulitis.       Vitals:    11/11/20 1800   Weight: 190 lb   Height: 6'       Body mass index is 25.77 kg/m .      TERRANCE Leal MD  Colon and Rectal Surgery Staff  [facility]    This note was created using speech recognition software and may contain unintended word substitutions.

## 2021-09-05 ENCOUNTER — HEALTH MAINTENANCE LETTER (OUTPATIENT)
Age: 41
End: 2021-09-05

## 2021-10-30 ENCOUNTER — NURSE TRIAGE (OUTPATIENT)
Dept: NURSING | Facility: CLINIC | Age: 41
End: 2021-10-30

## 2021-10-31 ENCOUNTER — OFFICE VISIT (OUTPATIENT)
Dept: URGENT CARE | Facility: URGENT CARE | Age: 41
End: 2021-10-31

## 2021-10-31 VITALS
WEIGHT: 200 LBS | BODY MASS INDEX: 27.12 KG/M2 | HEART RATE: 71 BPM | SYSTOLIC BLOOD PRESSURE: 130 MMHG | TEMPERATURE: 98.2 F | RESPIRATION RATE: 16 BRPM | OXYGEN SATURATION: 99 % | DIASTOLIC BLOOD PRESSURE: 83 MMHG

## 2021-10-31 DIAGNOSIS — R21 TARGET RASH: Primary | ICD-10-CM

## 2021-10-31 PROCEDURE — 99213 OFFICE O/P EST LOW 20 MIN: CPT | Performed by: FAMILY MEDICINE

## 2021-10-31 RX ORDER — HYDROCHLOROTHIAZIDE 25 MG/1
TABLET ORAL
COMMUNITY

## 2021-10-31 RX ORDER — DOXYCYCLINE 100 MG/1
100 CAPSULE ORAL 2 TIMES DAILY
Qty: 28 CAPSULE | Refills: 0 | Status: SHIPPED | OUTPATIENT
Start: 2021-10-31 | End: 2021-11-14

## 2021-10-31 NOTE — TELEPHONE ENCOUNTER
Patient was in [facility], returned to [facility] Thursday. Today significant other noticed that patient has a target ring on his back between his shoulder blades. The rash is a small red dot and then a red ring around it that is 2-3 cm wide. There is a very vivid ring.   Protocol recommends see physician within 24 hours.   Gave information on urgent cares open tomorrow.   Care advice given.   Patient will call back with any worsening symptoms.   Pepper Rudd RN   10/30/21 7:25 PM  [facility] Nurse Advisor  COVID 19 Nurse Triage Plan/Patient Instructions    Please be aware that novel coronavirus (COVID-19) may be circulating in the community. If you develop symptoms such as fever, cough, or SOB or if you have concerns about the presence of another infection including coronavirus (COVID-19), please contact your health care provider or visit [email or web address].     Disposition/Instructions    In-Person Visit with provider recommended. Reference Visit Selection Guide.    Thank you for taking steps to prevent the spread of this virus.  o Limit your contact with others.  o Wear a simple mask to cover your cough.  o Wash your hands well and often.    Resources    [facility]: About COVID-19: [email or web address]    CDC: What to Do If You're Sick: www.cdc.gov/coronavirus/2019-ncov/about/steps-when-sick.html    CDC: Ending Home Isolation: www.cdc.gov/coronavirus/2019-ncov/hcp/disposition-in-home-patients.html     CDC: Caring for Someone: www.cdc.gov/coronavirus/2019-ncov/if-you-are-sick/care-for-someone.html     [facility]: Interim Guidance for Hospital Discharge to Home: www.health.state.[place]/diseases/coronavirus/hcp/hospdischarge.pdf    [facility] clinical trials (COVID-19 research studies): [email or web address]/umn-clinical-trials     Below are the COVID-19 hotlines at the [facility] ([facility]). Interpreters are available.   o For health questions: Call  [phone] or 1-926.905.1612 (7 a.m. to 7 p.m.)  o For questions about schools and childcare: Call 370-124-0454 or 1-978.132.3538 (7 a.m. to 7 p.m.)     Reason for Disposition    Red ring or bull's-eye rash occurs at tick bite    Additional Information    Negative: Sounds like a life-threatening emergency to the triager    Negative: Not a tick bite    Negative: [1] 2 to 14 days following tick bite AND [2] severe headache with fever occurs    Negative: [1] 2 to 14 days following tick bite AND [2] widespread rash with fever occurs    Negative: Patient sounds very sick or weak to the triager    Negative: [1] Fever AND [2] red area    Negative: [1] Fever AND [2] area is very tender to touch    Negative: [1] Red streak or red line AND [2] length > 2 inches (5 cm)    Negative: Can't remove live tick (after trying Care Advice)    Negative: Can't remove tick's head that was broken off in the skin (after trying Care Advice)    Negative: [1] 2 to 14 days following tick bite AND [2] fever AND [3] no rash or headache    Negative: [1] 2 to 14 days following tick bite AND [2] widespread rash or headache AND [3] no fever    Negative: [1] Red or very tender (to touch) area AND [2] started over 24 hours after the bite    Protocols used: TICK BITE-A-

## 2021-10-31 NOTE — PATIENT INSTRUCTIONS
Patient Education     Lyme Disease  Lyme disease is caused by bacteria. The infection is most often passed during the bite of a deer tick. The tick is very small, so many people with Lyme disease don't know they have been bitten. Tests for Lyme disease are not always accurate early in the disease. If the disease is suspected, treatment may start before testing confirms the infection. A long course of antibiotics is the standard treatment.  If untreated, Lyme disease can cause symptoms in many parts of the body that may worsen.    Early symptoms limited to a small area may appear within a few days to a month after the tick bite. These symptoms may include a round, red rash that sometimes looks like a bull's-eye target with darker outer ring and a darker center. There may fever, chills, fatigue, body aches, and headache. In time, the rash goes away, even without treatment. That doesn't mean the infection has gone away, however. In some cases, early local symptoms never develop.    Early body-wide symptoms may appear weeks to months after the bite. These can include rashes on the skin of various parts of the body, muscle aches, fatigue, fever, headache, stiff neck, weakness on one side of the face, dizziness, palpitations, passing out, and joint pain and swelling.    Late-stage symptoms can include weakness in an arm, or leg, headache, fever, and numbness and tingling in the arms or legs, joint pain and swelling, confusion, and memory loss.    Many people will have left over symptoms even after treatment and cure of the Lyme disease. These are called post-Lyme symptoms and may include fatigue, body aches, joint aches, and headaches, which generally improve with time. Repeated courses of antibiotics don't help these symptoms to resolve faster. And, having the symptoms after a course of treatment does not mean that the Lyme bacteria is still active in the body.  Testing is done to check for the bacteria. When the  infection is treated early, it can be cured. In some cases, a second or third course of antibiotics may be needed. Be sure to follow your healthcare providers directions about treatment.  Home care  If antibiotic pills have been prescribed, take them exactly as directed until they are completely gone. Don't stop taking them until you have taken the full course or your healthcare provider has told you to stop.  Ask your healthcare provider about taking over-the-counter medicines to control symptoms such as aches and fever.  Follow-up care  Follow up with your healthcare provider, or as advised. Be sure to return for follow-up testing as directed to be sure the infection has been treated.  When to seek medical advice  Call your healthcare provider right away if any of the following occur:    Current symptoms get worse    Unexplained fever, neck pain or stiffness, or headache    Arm, leg or facial weakness    Joint pain or swelling    Numbness and tingling in the arms or legs    Confusion or memory loss    Irregular or rapid heartbeat, palpitations, dizziness, or passing out  Ary last reviewed this educational content on 3/1/2018    0383-0213 [employer]. All rights reserved. This information is not intended as a substitute for professional medical care. Always follow your healthcare professional's instructions.

## 2021-10-31 NOTE — PROGRESS NOTES
"  ASSESSMENT/ PLAN:  Target rash     - **Lyme Disease Ayana with reflex to WB Serum FUTURE 14d; Future  - doxycycline hyclate (VIBRAMYCIN) 100 MG capsule; Take 1 capsule (100 mg) by mouth 2 times daily for 14 days     No tick identified, but developed target rash after travel in [facility] [place]  Will treat empirically for lyme disease    We discussed the limitations of Lyme Disease testing early in the disease process  and the need  for possible repeat titer in the future  or if symptoms are worsening.  Lyme testing ordered    Given patient information on Lyme disease.  -------------------------------------------------------------------------------------------------------------------------------------------------------------      SUBJECTIVE:  Chief Complaint   Patient presents with     Derm Problem     Pt [place] [facility] and came back thursday. Last night pts wife noticed a red dot with a ring aroudn it between shoulder blades           Dawood Perez is a 41 year old male who presents to the clinic today with a target like rash with concern that it may be due to a tick bite.  No tick was removed from the skin recently  Onset of rash was 3 day(s) ago.   The patient has a history of recent travel to an endemic region for Lyme Disease  Rash is sudden onset, still present and constant.   Location of the rash:  right   back.  Quality/symptoms of rash: asymptomatic   Symptoms are mild and rash seems to be not changing over the course of time.     Associated symptoms include: nothing.  No associated fevers, chills, myalgias, arthralgias, chest pain  URI symptoms.      Past Medical History:   Diagnosis Date     Diverticulitis of colon      Hypertension     \"Boarderline hypertensive\"     Patient Active Problem List   Diagnosis     Diverticulitis of colon       ALLERGIES:  Bee venom and Penicillins    EPINEPHrine (ANY BX GENERIC EQUIV) 0.3 MG/0.3ML injection 2-pack, Inject 0.3 mLs (0.3 mg) into the muscle as needed for " anaphylaxis  hydrochlorothiazide (HYDRODIURIL) 25 MG tablet, hydrochlorothiazide 25 mg tablet    No current facility-administered medications on file prior to visit.      Social History     Tobacco Use     Smoking status: Never Smoker     Smokeless tobacco: Never Used   Substance Use Topics     Alcohol use: Yes     Comment: Two to three drinks per day       Family History   Problem Relation Age of Onset     Hypertension Mother      Heart Disease Father           ROS:  CONSTITUTIONAL:NEGATIVE for fever, chills,   EYES: NEGATIVE for vision changes or irritation  ENT/MOUTH: NEGATIVE for ear, mouth and throat problems  RESP:NEGATIVE for significant cough or SOB  GI: NEGATIVE for nausea, abdominal pain, , or change in bowel habits    EXAM:   /83   Pulse 71   Temp 98.2  F (36.8  C) (Oral)   Resp 16   Wt 90.7 kg (200 lb)   SpO2 99%   BMI 27.12 kg/m    GENERAL: alert, no acute distress.  SKIN: Rash description:    Distribution: localized  Location: right back    Color: red and tan,  Lesion type: macular, target shape 3 cm diameter, round with slight tenderness      EYES:   EOMI,   conjunctiva clear  HENT:   External ears with no swelling or lesions   Nose and lips without  Swelling, ulcers, erythema or lesions  NECK:   normal pain free ROM  RESP:   no labored respirations, no tachypnea  EXTREMITIES:   Full ROM without expression of pain or limitation x 4 extremities  NEURO:   Normal strength and tone, ambulation without difficulty,   normal speech and mentation

## 2021-12-26 ENCOUNTER — HEALTH MAINTENANCE LETTER (OUTPATIENT)
Age: 41
End: 2021-12-26

## 2022-04-26 ENCOUNTER — APPOINTMENT (RX ONLY)
Dept: URBAN - NONMETROPOLITAN AREA CLINIC 30 | Facility: CLINIC | Age: 42
Setting detail: DERMATOLOGY
End: 2022-04-26

## 2022-04-26 DIAGNOSIS — Z80.8 FAMILY HISTORY OF MALIGNANT NEOPLASM OF OTHER ORGANS OR SYSTEMS: ICD-10-CM

## 2022-04-26 DIAGNOSIS — D22 MELANOCYTIC NEVI: ICD-10-CM

## 2022-04-26 DIAGNOSIS — D49.2 NEOPLASM OF UNSPECIFIED BEHAVIOR OF BONE, SOFT TISSUE, AND SKIN: ICD-10-CM

## 2022-04-26 DIAGNOSIS — Z71.89 OTHER SPECIFIED COUNSELING: ICD-10-CM

## 2022-04-26 DIAGNOSIS — L81.4 OTHER MELANIN HYPERPIGMENTATION: ICD-10-CM

## 2022-04-26 DIAGNOSIS — D18.0 HEMANGIOMA: ICD-10-CM

## 2022-04-26 PROBLEM — D22.5 MELANOCYTIC NEVI OF TRUNK: Status: ACTIVE | Noted: 2022-04-26

## 2022-04-26 PROBLEM — D23.61 OTHER BENIGN NEOPLASM OF SKIN OF RIGHT UPPER LIMB, INCLUDING SHOULDER: Status: ACTIVE | Noted: 2022-04-26

## 2022-04-26 PROBLEM — D18.01 HEMANGIOMA OF SKIN AND SUBCUTANEOUS TISSUE: Status: ACTIVE | Noted: 2022-04-26

## 2022-04-26 PROBLEM — D23.71 OTHER BENIGN NEOPLASM OF SKIN OF RIGHT LOWER LIMB, INCLUDING HIP: Status: ACTIVE | Noted: 2022-04-26

## 2022-04-26 PROCEDURE — ? SHAVE REMOVAL

## 2022-04-26 PROCEDURE — 99203 OFFICE O/P NEW LOW 30 MIN: CPT | Mod: 25

## 2022-04-26 PROCEDURE — ? SUNSCREEN RECOMMENDATIONS

## 2022-04-26 PROCEDURE — ? COUNSELING

## 2022-04-26 PROCEDURE — 11301 SHAVE SKIN LESION 0.6-1.0 CM: CPT

## 2022-04-26 ASSESSMENT — LOCATION SIMPLE DESCRIPTION DERM
LOCATION SIMPLE: RIGHT UPPER BACK
LOCATION SIMPLE: CHEST
LOCATION SIMPLE: UPPER BACK
LOCATION SIMPLE: LEFT FOREARM
LOCATION SIMPLE: ABDOMEN

## 2022-04-26 ASSESSMENT — LOCATION DETAILED DESCRIPTION DERM
LOCATION DETAILED: RIGHT MEDIAL UPPER BACK
LOCATION DETAILED: LEFT MEDIAL INFERIOR CHEST
LOCATION DETAILED: EPIGASTRIC SKIN
LOCATION DETAILED: LEFT VENTRAL PROXIMAL FOREARM
LOCATION DETAILED: SUPERIOR THORACIC SPINE

## 2022-04-26 ASSESSMENT — LOCATION ZONE DERM
LOCATION ZONE: ARM
LOCATION ZONE: TRUNK

## 2022-04-26 NOTE — PROCEDURE: SHAVE REMOVAL
Detail Level: Detailed
Was A Bandage Applied: Yes
Size Of Lesion In Cm (Required): 0.6
Bill For Surgical Tray: no
Hemostasis: Drysol
Notification Instructions: Patient will be notified of pathology results. However, patient instructed to call the office if not contacted within 2 weeks.
Consent was obtained from the patient. The risks and benefits to therapy were discussed in detail. Specifically, the risks of infection, scarring, bleeding, prolonged wound healing, incomplete removal, allergy to anesthesia, nerve injury and recurrence were addressed. Prior to the procedure, the treatment site was clearly identified and confirmed by the patient. All components of Universal Protocol/PAUSE Rule completed.
Medical Necessity Information: It is in your best interest to select a reason for this procedure from the list below. All of these items fulfill various CMS LCD requirements except the new and changing color options.
X Size Of Lesion In Cm (Optional): 0
Biopsy Method: Dermablade
Billing Type: Third-Party Bill
Medical Necessity Clause: This procedure was medically necessary because the lesion that was treated was:
Anesthesia Type: 1% lidocaine with epinephrine
Wound Care: Petrolatum
Post-Care Instructions: I reviewed with the patient in detail post-care instructions. Patient is to keep the biopsy site dry overnight, and then apply bacitracin twice daily until healed. Patient may apply hydrogen peroxide soaks to remove any crusting.

## 2022-04-26 NOTE — PROCEDURE: SUNSCREEN RECOMMENDATIONS
Products Recommended: Hats, sun protective clothing, Sunscreen and reapply sunscreen. [place], Demarco G[place]am
Detail Level: Generalized
General Sunscreen Counseling: I recommended a broad spectrum sunscreen with a SPF of 50 or higher.  I explained that SPF 50 sunscreens block approximately 97 percent of the sun's harmful rays.  Sunscreens should be applied at least 15 minutes prior to expected sun exposure and then every 2 hours after that as long as sun exposure continues. If swimming or exercising sunscreen should be reapplied every 45 minutes to an hour after getting wet or sweating.  One ounce, or the equivalent of a shot glass full of sunscreen, is adequate to protect the skin not covered by a bathing suit. I also recommended a lip balm with a sunscreen as well. Sun protective clothing can be used in lieu of sunscreen but must be worn the entire time you are exposed to the sun's rays.

## 2022-10-23 ENCOUNTER — HEALTH MAINTENANCE LETTER (OUTPATIENT)
Age: 42
End: 2022-10-23

## 2023-04-02 ENCOUNTER — HEALTH MAINTENANCE LETTER (OUTPATIENT)
Age: 43
End: 2023-04-02

## 2024-06-08 ENCOUNTER — HEALTH MAINTENANCE LETTER (OUTPATIENT)
Age: 44
End: 2024-06-08

## (undated) DEVICE — ENDO TUBING CO2 SMARTCAP STERILE DISP 100145CO2EXT

## (undated) DEVICE — ENDO CAP AND TUBING STERILE FOR ENDOGATOR  100130

## (undated) DEVICE — KIT ENDO TURNOVER/PROCEDURE CARRY-ON 101822

## (undated) DEVICE — TUBING SUCTION 12"X1/4" N612

## (undated) DEVICE — SYR 50ML LL W/O NDL 309653

## (undated) DEVICE — SOL WATER IRRIG 1000ML BOTTLE 2F7114

## (undated) DEVICE — GLOVE PROTEXIS POWDER FREE SMT 6.5  2D72PT65X

## (undated) DEVICE — GOWN FLUID RESISTANT LEVEL 3 YELLOW

## (undated) DEVICE — PAD CHUX UNDERPAD 30X30"

## (undated) DEVICE — KIT CONNECTOR FOR OLYMPUS ENDOSCOPES DEFENDO 100310

## (undated) DEVICE — JELLY LUBRICATING SURGILUBE 2OZ TUBE

## (undated) DEVICE — KIT ENDO FIRST STEP DISINFECTANT 200ML W/POUCH EP-4

## (undated) DEVICE — WIPE PREMOIST CLEANSING WASHCLOTHS 7988

## (undated) DEVICE — SUCTION MANIFOLD NEPTUNE 2 SYS 1 PORT 702-025-000

## (undated) RX ORDER — PROPOFOL 10 MG/ML
INJECTION, EMULSION INTRAVENOUS
Status: DISPENSED
Start: 2020-10-09

## (undated) RX ORDER — LIDOCAINE HYDROCHLORIDE 20 MG/ML
INJECTION, SOLUTION EPIDURAL; INFILTRATION; INTRACAUDAL; PERINEURAL
Status: DISPENSED
Start: 2020-10-09

## (undated) RX ORDER — ONDANSETRON 2 MG/ML
INJECTION INTRAMUSCULAR; INTRAVENOUS
Status: DISPENSED
Start: 2020-10-09